# Patient Record
Sex: FEMALE | Race: WHITE
[De-identification: names, ages, dates, MRNs, and addresses within clinical notes are randomized per-mention and may not be internally consistent; named-entity substitution may affect disease eponyms.]

---

## 2017-03-30 ENCOUNTER — HOSPITAL ENCOUNTER (EMERGENCY)
Dept: HOSPITAL 80 - CED | Age: 27
Discharge: HOME | End: 2017-03-30
Payer: MEDICAID

## 2017-03-30 VITALS
DIASTOLIC BLOOD PRESSURE: 93 MMHG | TEMPERATURE: 97.9 F | RESPIRATION RATE: 16 BRPM | SYSTOLIC BLOOD PRESSURE: 152 MMHG | HEART RATE: 106 BPM | OXYGEN SATURATION: 96 %

## 2017-03-30 DIAGNOSIS — R11.2: Primary | ICD-10-CM

## 2017-03-30 NOTE — UCPHY
H & P


Patient Type: Established


Chief Complaint Nursing Narrative: HA, n/v, dizziness started last night


Time Seen by Provider: 03/30/17 13:15


HPI/ROS: 





Chief Complaint:  Nausea, vomiting, abdominal pain, headache





HPI:  26-year-old female presenting complaining of onset of nausea and vomiting 

last night.  She has had some loose stools as well. Has developed a headache 

this morning.  At worst is a 7/10.  Not the worst headache of her life.  She 

does have a history of migraines but states that this feels different.  Some 

subjective chills but no fevers.  Some mild crampy abdominal pain. No known ill 

contacts.  She has not been able to keep any fluids down.  Patient states her 

headache is worse when she sits up, feels better when she lays down.  No 

photophobia.





ROS:  10 point Review of Systems is negative except as noted in the HPI.





PMH:  Harm associated migraine headaches





Social History: No smoking, no alcohol,  no recreational drug use





Family History: non-contributory





Physical Exam:


Gen: Awake, Alert, No Distress


HEENT:  


     Nose: no rhinorrhea


     Eyes: PERRLA, EOMI


     Mouth:  Dry mucous membranes 


Neck: Supple, no JVD, no meningismus


Chest: nontender, lungs clear to auscultation


Heart: S1, S2 normal, no murmur


Abd: Soft, mild epigastric tenderness, no guarding


Back: no CVA tenderness, no midline tenderness 


Ext: no edema, non-tender


Skin: no rash


Neuro: CN II-XII intact, Sensation grossly intact, Strength 5/5 in bilateral 

upper and lower extremities








- Personal History


LMP (Females 10-55): 1-7 Days Ago


Tetanus Vaccine Date: 2005





- Medical/Surgical History


Hx Asthma: No


Hx Chronic Respiratory Disease: No


Hx Diabetes: No


Hx Cardiac Disease: No


Hx Renal Disease: No


Hx Cirrhosis: No


Hx Alcoholism: No


Hx HIV/AIDS: No


Hx Splenectomy or Spleen Trauma: No


Other PMH: SINUS SURGERY, TONSILECTOMY





- Family History


Significant Family History: No pertinent family hx





- Social History


Smoking Status: Never smoked


Constitutional: 


 Initial Vital Signs











Temperature (C)  36.6 C   03/30/17 13:01


 


Heart Rate  106 H  03/30/17 13:01


 


Respiratory Rate  16   03/30/17 13:01


 


Blood Pressure  152/93 H  03/30/17 13:01


 


O2 Sat (%)  96   03/30/17 13:01








 











O2 Delivery Mode               Room Air














Allergies/Adverse Reactions: 


 





adhesive [Adhesive] Allergy (Verified 11/10/13 20:50)


 


aspirin [Aspirin] Allergy (Verified 11/10/13 20:50)


 


clindamycin [Clindamycin] Allergy (Verified 11/10/13 20:50)


 


cyclobenzaprine HCl [From Flexeril] Allergy (Verified 11/10/13 20:50)


 


gabapentin [From Neurontin] Allergy (Verified 11/10/13 20:50)


 


hydromorphone HCl [From Dilaudid] Allergy (Verified 11/10/13 20:50)


 


ipratropium bromide [From Atrovent] Allergy (Verified 11/10/13 20:50)


 


ketorolac tromethamine [From Toradol] Allergy (Verified 11/10/13 20:50)


 


latex [Latex] Allergy (Verified 11/10/13 20:50)


 


lorazepam [From Ativan] Allergy (Verified 11/10/13 20:50)


 


nabumetone [From Relafen] Allergy (Verified 11/10/13 20:50)


 


prednisone [Prednisone] Allergy (Verified 11/10/13 20:50)


 


sumatriptan [From Imitrex] Allergy (Verified 11/10/13 20:50)


 


sumatriptan succinate [From Imitrex] Allergy (Verified 11/10/13 20:50)


 


vancomycin [Vancomycin] Allergy (Verified 11/10/13 20:50)


 








Home Medications: 














 Medication  Instructions  Recorded


 


NK [No Known Home Meds]  03/30/17














Medical Decision Making


ED Course/Re-evaluation: 





Patient is improved after antiemetics and fluids.  She is tolerating p.o..  

Will discharge with follow-up with primary care physician.





- Data Points


Medications Given: 


 








Discontinued Medications





Haloperidol Lactate (Haldol Injection)  2.5 mg IVP EDNOW ONE


   Stop: 03/30/17 14:31


   Last Admin: 03/30/17 14:33 Dose:  2.5 mg


Sodium Chloride (Ns)  1,000 mls @ 0 mls/hr IV ONCE ONE


   PRN Reason: Wide Open


   Stop: 03/30/17 13:34


   Last Admin: 03/30/17 14:00 Dose:  1,000 mls


Sodium Chloride (Ns)  1,000 mls @ 0 mls/hr IV ONCE ONE


   PRN Reason: Wide Open


   Stop: 03/30/17 15:14


   Last Admin: 03/30/17 15:00 Dose:  1,000 mls


Ondansetron HCl (Zofran)  4 mg IVP EDNOW ONE


   Stop: 03/30/17 13:34


   Last Admin: 03/30/17 14:19 Dose:  4 mg


Ondansetron HCl (Zofran Odt 4 Mg Prepack#2)  1 btl TAKEHOME EDNOW ONE


   Stop: 03/30/17 15:16


   Last Admin: 03/30/17 15:17 Dose:  1 btl








Departure





- Departure


Disposition: Home, Routine, Self-Care


Clinical Impression: 


 Nausea and vomiting





Condition: Good


Instructions:  Acute Nausea and Vomiting (ED)


Additional Instructions: 


May take ondansetron as needed for nausea and vomiting.


Make sure to drink plenty of fluids.


Follow up with your primary care physician in 2-3 days if symptoms are not 

improving.


Referrals: 


Rose Sifuentes MD [Primary Care Provider] - As per Instructions





- PQRS


PQRS Measurement: 





NA

## 2017-04-04 ENCOUNTER — HOSPITAL ENCOUNTER (OUTPATIENT)
Dept: HOSPITAL 80 - CIMAGING | Age: 27
End: 2017-04-04
Attending: FAMILY MEDICINE
Payer: MEDICAID

## 2017-04-04 DIAGNOSIS — M79.671: Primary | ICD-10-CM

## 2017-05-08 ENCOUNTER — HOSPITAL ENCOUNTER (EMERGENCY)
Dept: HOSPITAL 80 - CED | Age: 27
Discharge: HOME | End: 2017-05-08
Payer: MEDICAID

## 2017-05-08 VITALS
DIASTOLIC BLOOD PRESSURE: 85 MMHG | SYSTOLIC BLOOD PRESSURE: 147 MMHG | RESPIRATION RATE: 16 BRPM | OXYGEN SATURATION: 96 % | HEART RATE: 81 BPM | TEMPERATURE: 97.5 F

## 2017-05-08 DIAGNOSIS — N61.1: Primary | ICD-10-CM

## 2017-05-08 DIAGNOSIS — Z91.040: ICD-10-CM

## 2017-05-08 PROCEDURE — 0H9U3ZZ DRAINAGE OF LEFT BREAST, PERCUTANEOUS APPROACH: ICD-10-PCS | Performed by: EMERGENCY MEDICINE

## 2017-05-08 NOTE — EDPHY
H & P


Stated Complaint: c/o cyst to lt breast x5 days


Time Seen by Provider: 05/08/17 13:40


HPI/ROS: 





CHIEF COMPLAINT:  Draining abscess on left breast





HISTORY OF PRESENT ILLNESS:  The patient presents to the ED with complaints of 

a draining abscess on her left breast.  It has been present for 5 days.  She 

has been squeezing it at home with a discharge of pus.  She was going to follow 

up with her primary care provider but instead sought care in the ED.  The 

patient is currently breast-feeding.  The patient denies prior history of 

significant soft tissue infections or abscess.  She denies history of IV drug 

use.  She denies history of fever.  The patient has no additional complaints. 

She complains of mild to moderate pain and swelling in the left breast at the 2:

00 position.





REVIEW OF SYSTEMS:


A comprehensive 10 point review of systems is otherwise negative aside from 

elements mentioned in the history of present illness.


Source: Patient


Exam Limitations: No limitations





- Personal History


LMP (Females 10-55): 15-21 Days Ago


Tetanus Vaccine Date: 2005





- Medical/Surgical History


Hx Asthma: No


Hx Chronic Respiratory Disease: No


Hx Diabetes: No


Hx Cardiac Disease: No


Hx Renal Disease: No


Hx Cirrhosis: No


Hx Alcoholism: No


Hx HIV/AIDS: No


Hx Splenectomy or Spleen Trauma: No


Other PMH: SINUS SURGERY, TONSILECTOMY





- Social History


Smoking Status: Never smoked





- Physical Exam


Exam: 





General Appearance:  Alert, no distress


Eyes:  Pupils equal and round no pallor or injection


ENT, Mouth:  Mucous membranes moist


Respiratory:  There are no retractions, lungs are clear to auscultation


Cardiovascular:  Regular rate and rhythm


Gastrointestinal:  Abdomen is soft and nontender, no masses, bowel sounds normal


Neurological:  A&O, normal motor function, normal sensory exam, normal cranial 

nerves


Skin:  Small 1 cm subcutaneous abscess noted to the left breast at the 2 o'

clock position.  It is draining mild purulent material 


Musculoskeletal:  Neck is supple nontender


Extremities:  symmetrical, full range of motion


Psychiatric:  Patient is oriented X 3, there is no agitation


Constitutional: 


 Initial Vital Signs











Heart Rate  85   05/08/17 13:18


 


Respiratory Rate  18   05/08/17 13:18


 


Blood Pressure  152/66 H  05/08/17 13:18


 


O2 Sat (%)  97   05/08/17 13:18








 











O2 Delivery Mode               Room Air














Allergies/Adverse Reactions: 


 





adhesive [Adhesive] Allergy (Verified 11/10/13 20:50)


 


aspirin [Aspirin] Allergy (Verified 11/10/13 20:50)


 


clindamycin [Clindamycin] Allergy (Verified 11/10/13 20:50)


 


cyclobenzaprine HCl [From Flexeril] Allergy (Verified 11/10/13 20:50)


 


gabapentin [From Neurontin] Allergy (Verified 11/10/13 20:50)


 


hydromorphone HCl [From Dilaudid] Allergy (Verified 11/10/13 20:50)


 


ipratropium bromide [From Atrovent] Allergy (Verified 11/10/13 20:50)


 


ketorolac tromethamine [From Toradol] Allergy (Verified 11/10/13 20:50)


 


latex [Latex] Allergy (Verified 11/10/13 20:50)


 


lorazepam [From Ativan] Allergy (Verified 11/10/13 20:50)


 


nabumetone [From Relafen] Allergy (Verified 11/10/13 20:50)


 


prednisone [Prednisone] Allergy (Verified 11/10/13 20:50)


 


sumatriptan [From Imitrex] Allergy (Verified 11/10/13 20:50)


 


sumatriptan succinate [From Imitrex] Allergy (Verified 11/10/13 20:50)


 


vancomycin [Vancomycin] Allergy (Verified 11/10/13 20:50)


 








Home Medications: 














 Medication  Instructions  Recorded


 


Cephalexin [Keflex] 500 mg PO QID #28 cap 05/08/17














Medical Decision Making


Procedures: 





Procedure:  Abscess drainage.





The patient's abscess was located on the left breast.  Risks, benefits, 

alternatives discussed with the patient and consent obtained.  The abscess was 

incised with a #11 blade and purulent drainage was expressed. The patient 

tolerated the procedure well.  The procedure was performed by myself.


ED Course/Re-evaluation: 





The patient presents to the ED with a very small 1 cm superficial abscess to 

her left breast which was drained by myself without complication.  A wound 

culture has been obtained.  The patient will be started on Keflex.  I have 

asked her to follow up for a wound check and comprehensive breast exam with our 

general surgeon in the next week.  The patient is advised to return to the ED 

for markedly worsening symptoms or other concerns.





Departure





- Departure


Disposition: Home, Routine, Self-Care


Clinical Impression: 


 Breast abscess





Condition: Good


Instructions:  Abscess (ED)


Additional Instructions: 


1. Please take antibiotics as directed.


2. Please schedule a follow-up appointment with the surgeon you have been 

referred to for a recheck within the next week.


3. Please return to the ED sooner for increasing pain, redness, swelling or 

fever.


4. It is important to your evaluated by a surgeon for a wound check and a 

comprehensive breast exam in the week.








Referrals: 


Rose Che MD [Medical Doctor] - As per Instructions


Rose Sifuentes MD [Primary Care Provider] - As per Instructions

## 2017-05-16 ENCOUNTER — HOSPITAL ENCOUNTER (OUTPATIENT)
Dept: HOSPITAL 80 - CLAB | Age: 27
End: 2017-05-16
Attending: FAMILY MEDICINE
Payer: MEDICAID

## 2017-05-16 DIAGNOSIS — M79.671: Primary | ICD-10-CM

## 2017-08-31 ENCOUNTER — HOSPITAL ENCOUNTER (EMERGENCY)
Dept: HOSPITAL 80 - CED | Age: 27
Discharge: HOME | End: 2017-08-31
Payer: MEDICAID

## 2017-08-31 VITALS — SYSTOLIC BLOOD PRESSURE: 135 MMHG | DIASTOLIC BLOOD PRESSURE: 77 MMHG | RESPIRATION RATE: 14 BRPM | HEART RATE: 75 BPM

## 2017-08-31 VITALS — TEMPERATURE: 98.6 F | OXYGEN SATURATION: 94 %

## 2017-08-31 DIAGNOSIS — Z91.040: ICD-10-CM

## 2017-08-31 DIAGNOSIS — E86.9: ICD-10-CM

## 2017-08-31 DIAGNOSIS — R10.13: ICD-10-CM

## 2017-08-31 DIAGNOSIS — G43.009: Primary | ICD-10-CM

## 2017-08-31 LAB
% IMMATURE GRANULYOCYTES: 0.3 % (ref 0–1.1)
ABSOLUTE IMMATURE GRANULOCYTES: 0.03 10^3/UL (ref 0–0.1)
ABSOLUTE NRBC COUNT: 0 10^3/UL (ref 0–0.01)
ADD DIFF?: NO
ADD MORPH?: NO
ADD SCAN?: NO
ALBUMIN SERPL-MCNC: 4.6 G/DL (ref 3.5–5)
ALP SERPL-CCNC: 98 IU/L (ref 38–126)
ALT SERPL-CCNC: 35 IU/L (ref 9–52)
ANION GAP SERPL CALC-SCNC: 15 MEQ/L (ref 8–16)
AST SERPL-CCNC: 26 IU/L (ref 14–46)
ATYPICAL LYMPHOCYTE FLAG: 0 (ref 0–99)
BILIRUB SERPL-MCNC: 0.7 MG/DL (ref 0.1–1.4)
CALCIUM SERPL-MCNC: 9.5 MG/DL (ref 8.5–10.4)
CHLORIDE SERPL-SCNC: 106 MEQ/L (ref 97–110)
CO2 SERPL-SCNC: 22 MEQ/L (ref 22–31)
CREAT SERPL-MCNC: 0.5 MG/DL (ref 0.6–1)
ERYTHROCYTE [DISTWIDTH] IN BLOOD BY AUTOMATED COUNT: 13.6 % (ref 11.5–15.2)
FRAGMENT RBC FLAG: 0 (ref 0–99)
GFR SERPL CREATININE-BSD FRML MDRD: > 60 ML/MIN/{1.73_M2}
GLUCOSE SERPL-MCNC: 103 MG/DL (ref 70–100)
HCT VFR BLD CALC: 43.7 % (ref 38–47)
HGB BLD-MCNC: 14.8 G/DL (ref 12.6–16.3)
LEFT SHIFT FLG: 0 (ref 0–99)
LIPEMIA HEMOLYSIS FLAG: 90 (ref 0–99)
MCH RBC BLDCO QN: 27.8 PG (ref 27.9–34.1)
MCHC RBC AUTO-ENTMCNC: 33.9 G/DL (ref 32.4–36.7)
MCV RBC AUTO: 82 FL (ref 81.5–99.8)
NRBC-AUTO%: 0 % (ref 0–0.2)
PLATELET # BLD: 244 10^3/UL (ref 150–400)
PLATELET CLUMPS FLAG: 0 (ref 0–99)
PMV BLD AUTO: 10.7 FL (ref 8.7–11.7)
POTASSIUM SERPL-SCNC: 3.7 MEQ/L (ref 3.5–5.2)
PROT SERPL-MCNC: 8.2 G/DL (ref 6.3–8.2)
RBC # BLD AUTO: 5.33 10^6/UL (ref 4.18–5.33)
SODIUM SERPL-SCNC: 143 MEQ/L (ref 134–144)

## 2017-08-31 NOTE — EDPHY
H & P


Stated Complaint: nausea/vomiting x 4 today with epigastric abd pain and HA.


Time Seen by Provider: 17 21:15


HPI/ROS: 


This patient presents complaining of headache, epigastric pain and vomiting.  

She was seen earlier today at [facility] for concern of 

mental health with history of severe PTSD and anxiety.  She had seen her 

primary care physician the day before for anxiety and was started on Xanax.  

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 

an concerned about her mental state and she was transported to [facility] for 

evaluation where she had mental health evaluation with no suicidal ideation or 

psychotic symptoms.  The patient does have a history of PTSD and as Buerger's 

syndrome.  Records were faxed from [facility] of initially and review of her labs 

reveals no significant abnormalities included comp metabolic panel and TSH and 

CBC.  She was prescribed Zofran for her nausea and vomiting but reported a 

recurrent episode of vomiting thereafter despite Zofran use.  She states that 

her frontal headache worsened in his reminiscent of occasional migraines that 

she gets.  Current headache is left frontal location more than right achy in 

nature with occasional sharp pain peak intensity 6/10 which is her current 

intensity of pain.  She also reports associated photophobia.  She denies any 

worsening of the headache with movement and notes no other exacerbating 

factors.  She has not tried any analgesics for her headache or epigastric pain 

due to the ongoing nausea.  She reports the epigastric pain does not radiate to 

her back in his also 6/10 intensity achy in nature.  Finally, she reports mild 

dizziness that sounds slightly vertiginous in nature with no exacerbating or 

alleviating factors except slight worsening with movement.  She was driven here 

by friend by private vehicle for further evaluation.





ROS:  No recent fevers or chills.  No other constitutional symptoms


Psychiatric:  She admits anxiety and some chronic depression but no other new 

symptoms


HEENT:  No recent URI symptoms.  No recent head trauma.  No sore throat or ear 

pain.


Neuro:  No numbness tingling or focal weakness.  No visual changes.


Pulmonary:  No cough, chest pain or shortness of breath


Cardiovascular:  No chest pain, heart palpitations or lightheadedness.


GI:  She reports normal bowel movements.  No bloating.  No hematemesis or coffee

-ground emesis.


:  No vaginal discharge.  No urinary symptoms.


Integumentary:  No skin rash


Complete review of symptoms is otherwise negative.


Source: Patient, RN/MD





- Personal History


LMP (Females 10-55): 8-14 Days Ago


Current Tetanus Diphtheria and Acellular Pertussis (TDAP): Yes


Tetanus Vaccine Date: 





- Medical/Surgical History


PMH: 





PTSD





Asperger's syndrome





Anxiety





Depression





Occasional migraines


Hx Asthma: No


Hx Chronic Respiratory Disease: No


Hx Diabetes: No


Hx Cardiac Disease: No


Hx Renal Disease: No


Hx Cirrhosis: No


Hx Alcoholism: No


Hx HIV/AIDS: No


Hx Splenectomy or Spleen Trauma: No


Other PMH: SINUS SURGERY, TONSILECTOMY, anxiety/depression,  





- Family History


Significant Family History: No pertinent family hx





- Social History


Smoking Status: Never smoked


Alcohol Use: None


Drug Use: Marijuana





- Physical Exam


Exam: 





Physical exam:


Vital signs are normal


General:  Patient is in no acute distress.


HEENT:  Is no external evidence of trauma on exam.  Eyes:  Pupils are equal and 

reactive to light.  Extraocular motions are intact.  Optic fundi:  Clear with 

no papilledema or hemorrhage.  Nose atraumatic.  Ears:  Clear bilaterally with 

no hemotympanum.  Oropharynx:  No dental trauma or malocclusion.  No intraoral 

lacerations.


Eyes:  Pupils are equal and reactive to light.  Extraocular motions are intact.

  Optic fundi:  Clear with no papilledema or hemorrhage.


Lungs:  Clear to auscultation bilaterally


Neck:  Supple no meningismus.


Cardiac:  Regular rate and rhythm no murmur gallop or rub.


Abdomen:  Normoactive, soft, mild epigastric tenderness.  No guarding or 

rebound.  No organomegaly.


Neuro:  GCS of 15.  Cranial nerves II through XII intact.  Cerebellar exam is 

normal as judged by symmetric rapid hand movements bilaterally.  No pronator 

drift.  No sensory or motor deficits are appreciated.


Psychiatric:  She denies suicidal ideation or homicidal ideation.  No psychotic 

symptoms.  Flat affect and sluggish to respond but she does respond 

appropriately when pressed.





Initial differential diagnosis:  Migraine, tension headache, CNS lesion, 

intracranial bleed, gastritis, hepatitis, pancreatitis


Constitutional: 


 Initial Vital Signs











Temperature (C)  37 C   17 20:50


 


Heart Rate  81   17 20:50


 


Respiratory Rate  16   17 20:50


 


Blood Pressure  147/88 H  17 20:50


 


O2 Sat (%)  94   17 20:50








 











O2 Delivery Mode               Room Air














Allergies/Adverse Reactions: 


 





adhesive [Adhesive] Allergy (Verified 17 20:49)


 


aspirin [Aspirin] Allergy (Verified 17 20:49)


 


clindamycin [Clindamycin] Allergy (Verified 17 20:49)


 


cyclobenzaprine HCl [From Flexeril] Allergy (Verified 17 20:49)


 


gabapentin [From Neurontin] Allergy (Verified 17 20:49)


 


hydromorphone HCl [From Dilaudid] Allergy (Verified 17 20:49)


 


ipratropium bromide [From Atrovent] Allergy (Verified 17 20:49)


 


ketorolac tromethamine [From Toradol] Allergy (Verified 17 20:49)


 


latex [Latex] Allergy (Verified 17 20:49)


 


lorazepam [From Ativan] Allergy (Verified 17 20:49)


 


nabumetone [From Relafen] Allergy (Verified 17 20:49)


 


prednisone [Prednisone] Allergy (Verified 17 20:49)


 


sumatriptan [From Imitrex] Allergy (Verified 17 20:49)


 


sumatriptan succinate [From Imitrex] Allergy (Verified 17 20:49)


 


vancomycin [Vancomycin] Allergy (Verified 17 20:49)


 








Home Medications: 














 Medication  Instructions  Recorded


 


ALPRAZolam [Xanax 0.25 MG (*)]  17














Medical Decision Making


ED Course/Re-evaluation: 





IV normal saline bolus





Reglan and, Benadryl, magnesium IV with resolution of headache down to 1/10.





He had persistent mild epigastric pain at 3/10 after initial treatment treated 

with Levsin with further improvement.  She also had resolution of her nausea.





Review of labs reveals normal white blood cell count, no significant 

abnormalities with her metabolic panel.  HCG is negative.





Discussion:  I think this patient has headache and vomiting attributable to 

migraine.  She had minimal epigastric pain prior to vomiting and thinks she has 

some gastritis from the vomiting.  I counseled her regarding this.  While she 

has some depression she has no suicidal ideation.  Encouraged her to a hold off 

on marijuana use suggest follow up with her psychiatrist.  She has an 

appointment for the  week of September.





Also encouraged her to follow up with primary care physician for further 

evaluation of any ongoing headaches.





She understands need to return for any significant recurrence or worsening of 

her headache or abdominal pain.





Patient's friend had to go home as she works over the morning.





At 1945 the patient's symptoms significantly improved the plan to take a 

taxi home.





- Data Points


Laboratory Results: 


 Laboratory Results





 17 21:00 





 17 21:00 





 











  17





  21:00 21:00 21:00


 


WBC      9.23 10^3/uL 10^3/uL





     (3.80-9.50) 


 


RBC      5.33 10^6/uL 10^6/uL





     (4.18-5.33) 


 


Hgb      14.8 g/dL g/dL





     (12.6-16.3) 


 


Hct      43.7 % %





     (38.0-47.0) 


 


MCV      82.0 fL fL





     (81.5-99.8) 


 


MCH      27.8 pg L pg





     (27.9-34.1) 


 


MCHC      33.9 g/dL g/dL





     (32.4-36.7) 


 


RDW      13.6 % %





     (11.5-15.2) 


 


Plt Count      244 10^3/uL 10^3/uL





     (150-400) 


 


MPV      10.7 fL fL





     (8.7-11.7) 


 


Neut % (Auto)      82.8 % H %





     (39.3-74.2) 


 


Lymph % (Auto)      10.5 % L %





     (15.0-45.0) 


 


Mono % (Auto)      5.6 % %





     (4.5-13.0) 


 


Eos % (Auto)      0.5 % L %





     (0.6-7.6) 


 


Baso % (Auto)      0.3 % %





     (0.3-1.7) 


 


Nucleat RBC Rel Count      0.0 % %





     (0.0-0.2) 


 


Absolute Neuts (auto)      7.63 10^3/uL H 10^3/uL





     (1.70-6.50) 


 


Absolute Lymphs (auto)      0.97 10^3/uL L 10^3/uL





     (1.00-3.00) 


 


Absolute Monos (auto)      0.52 10^3/uL 10^3/uL





     (0.30-0.80) 


 


Absolute Eos (auto)      0.05 10^3/uL 10^3/uL





     (0.03-0.40) 


 


Absolute Basos (auto)      0.03 10^3/uL 10^3/uL





     (0.02-0.10) 


 


Absolute Nucleated RBC      0.00 10^3/uL 10^3/uL





     (0-0.01) 


 


Immature Gran %      0.3 % %





     (0.0-1.1) 


 


Immature Gran #      0.03 10^3/uL 10^3/uL





     (0.00-0.10) 


 


Sodium    143 mEq/L mEq/L  





    (134-144)  


 


Potassium    3.7 mEq/L mEq/L  





    (3.5-5.2)  


 


Chloride    106 mEq/L mEq/L  





    ()  


 


Carbon Dioxide    22 mEq/l mEq/l  





    (22-31)  


 


Anion Gap    15 mEq/L mEq/L  





    (8-16)  


 


BUN    8 mg/dL mg/dL  





    (7-23)  


 


Creatinine    0.5 mg/dL L mg/dL  





    (0.6-1.0)  


 


Estimated GFR    > 60   





    


 


Glucose    103 mg/dL H mg/dL  





    ()  


 


Calcium    9.5 mg/dL mg/dL  





    (8.5-10.4)  


 


Total Bilirubin    0.7 mg/dL mg/dL  





    (0.1-1.4)  


 


AST    26 IU/L IU/L  





    (14-46)  


 


ALT    35 IU/L IU/L  





    (9-52)  


 


Alkaline Phosphatase    98 IU/L IU/L  





    ()  


 


Total Protein    8.2 g/dL g/dL  





    (6.3-8.2)  


 


Albumin    4.6 g/dL g/dL  





    (3.5-5.0)  


 


Lipase    112 IU/L IU/L  





    ()  


 


Beta HCG, Qual  NEGATIVE     





    











Medications Given: 


 








Discontinued Medications





Diphenhydramine HCl (Benadryl Injection)  25 mg IVP EDNOW ONE


   Stop: 17 21:30


   Last Admin: 17 21:49 Dose:  25 mg


Hyoscyamine Sulfate (Levsin, Hyomax-Sl)  0.125 mg PO EDNOW ONE


   Stop: 17 22:34


   Last Admin: 17 22:37 Dose:  0.125 mg


Sodium Chloride (Ns)  1,000 mls @ 0 mls/hr IV EDNOW ONE; Wide Open


   PRN Reason: Protocol


   Stop: 17 21:16


   Last Admin: 17 21:49 Dose:  1,000 mls


Magnesium Sulfate/Dextrose (Magnesium Sulf 1 Gm (Premix))  100 mls @ 100 mls/hr 

IV EDNOW ONE


   Stop: 17 22:29


   Last Admin: 17 21:48 Dose:  100 mls


Metoclopramide HCl (Reglan Injection)  10 mg IVP EDNOW ONE


   Stop: 17 21:30


   Last Admin: 17 21:49 Dose:  10 mg








Departure





- Departure


Disposition: Home, Routine, Self-Care


Clinical Impression: 


 Epigastric abdominal pain





Migraine


Qualifiers:


 Migraine type: without aura Status migrainosus presence: without status 

migrainosus Intractability: not intractable Qualified Code(s): G43.009 - 

Migraine without aura, not intractable, without status migrainosus





Condition: Good


Instructions:  Gastritis (ED), Migraine Headache (ED), Acute Nausea and 

Vomiting (ED)


Additional Instructions: 


Diagnoses:  1.  Migraine headache 2.  Vomiting 3. Gastritis





Plan:  Ibuprofen and Tylenol for headache if needed





Try Zofran for nausea if needed





Light diet to feel improved





Maalox for epigastric pain if needed





Call primary care physician to arrange follow-up appointment for recheck in 1-3 

days.





Follow-up with your psychiatrist within the week





Stop taking the Xanax





Return the emergency department for any significant worsening despite the 

treatment plan.

## 2017-11-24 ENCOUNTER — HOSPITAL ENCOUNTER (EMERGENCY)
Dept: HOSPITAL 80 - FED | Age: 27
Discharge: HOME | End: 2017-11-24
Payer: MEDICAID

## 2017-11-24 VITALS — HEART RATE: 82 BPM | DIASTOLIC BLOOD PRESSURE: 82 MMHG | SYSTOLIC BLOOD PRESSURE: 128 MMHG

## 2017-11-24 VITALS — TEMPERATURE: 98.6 F | RESPIRATION RATE: 16 BRPM | OXYGEN SATURATION: 99 %

## 2017-11-24 DIAGNOSIS — Z91.040: ICD-10-CM

## 2017-11-24 DIAGNOSIS — Z04.41: Primary | ICD-10-CM

## 2017-11-24 DIAGNOSIS — Z79.82: ICD-10-CM
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
